# Patient Record
Sex: MALE | Race: WHITE | NOT HISPANIC OR LATINO | Employment: FULL TIME | URBAN - METROPOLITAN AREA
[De-identification: names, ages, dates, MRNs, and addresses within clinical notes are randomized per-mention and may not be internally consistent; named-entity substitution may affect disease eponyms.]

---

## 2019-04-03 ENCOUNTER — HOSPITAL ENCOUNTER (EMERGENCY)
Facility: HOSPITAL | Age: 24
Discharge: HOME | End: 2019-04-03
Attending: EMERGENCY MEDICINE | Admitting: EMERGENCY MEDICINE

## 2019-04-03 ENCOUNTER — APPOINTMENT (OUTPATIENT)
Dept: CT IMAGING | Facility: HOSPITAL | Age: 24
End: 2019-04-03

## 2019-04-03 VITALS
TEMPERATURE: 98.1 F | BODY MASS INDEX: 23.74 KG/M2 | OXYGEN SATURATION: 99 % | HEIGHT: 74 IN | DIASTOLIC BLOOD PRESSURE: 70 MMHG | SYSTOLIC BLOOD PRESSURE: 118 MMHG | HEART RATE: 70 BPM | WEIGHT: 185 LBS | RESPIRATION RATE: 18 BRPM

## 2019-04-03 DIAGNOSIS — G43.109 OCULAR MIGRAINE: Primary | ICD-10-CM

## 2019-04-03 PROCEDURE — 25010000002 METOCLOPRAMIDE PER 10 MG: Performed by: EMERGENCY MEDICINE

## 2019-04-03 PROCEDURE — 96374 THER/PROPH/DIAG INJ IV PUSH: CPT

## 2019-04-03 PROCEDURE — 70450 CT HEAD/BRAIN W/O DYE: CPT

## 2019-04-03 PROCEDURE — 25010000002 DEXAMETHASONE PER 1 MG: Performed by: EMERGENCY MEDICINE

## 2019-04-03 PROCEDURE — 99284 EMERGENCY DEPT VISIT MOD MDM: CPT

## 2019-04-03 PROCEDURE — 96375 TX/PRO/DX INJ NEW DRUG ADDON: CPT

## 2019-04-03 PROCEDURE — 25010000002 KETOROLAC TROMETHAMINE PER 15 MG: Performed by: EMERGENCY MEDICINE

## 2019-04-03 RX ORDER — METOCLOPRAMIDE 10 MG/1
TABLET ORAL
Qty: 20 TABLET | Refills: 0 | Status: SHIPPED | OUTPATIENT
Start: 2019-04-03

## 2019-04-03 RX ORDER — METOCLOPRAMIDE HYDROCHLORIDE 5 MG/ML
10 INJECTION INTRAMUSCULAR; INTRAVENOUS ONCE
Status: COMPLETED | OUTPATIENT
Start: 2019-04-03 | End: 2019-04-03

## 2019-04-03 RX ORDER — TOPIRAMATE 25 MG/1
25 TABLET ORAL 2 TIMES DAILY
Qty: 60 TABLET | Refills: 0 | Status: SHIPPED | OUTPATIENT
Start: 2019-04-03 | End: 2019-05-03

## 2019-04-03 RX ORDER — KETOROLAC TROMETHAMINE 15 MG/ML
10 INJECTION, SOLUTION INTRAMUSCULAR; INTRAVENOUS ONCE
Status: COMPLETED | OUTPATIENT
Start: 2019-04-03 | End: 2019-04-03

## 2019-04-03 RX ORDER — DEXAMETHASONE IN 0.9 % SOD CHL 10 MG/50ML
10 INTRAVENOUS SOLUTION, PIGGYBACK (ML) INTRAVENOUS ONCE
Status: COMPLETED | OUTPATIENT
Start: 2019-04-03 | End: 2019-04-03

## 2019-04-03 RX ORDER — SODIUM CHLORIDE 0.9 % (FLUSH) 0.9 %
10 SYRINGE (ML) INJECTION AS NEEDED
Status: DISCONTINUED | OUTPATIENT
Start: 2019-04-03 | End: 2019-04-04 | Stop reason: HOSPADM

## 2019-04-03 RX ADMIN — METOCLOPRAMIDE 10 MG: 5 INJECTION, SOLUTION INTRAMUSCULAR; INTRAVENOUS at 21:21

## 2019-04-03 RX ADMIN — KETOROLAC TROMETHAMINE 10 MG: 15 INJECTION, SOLUTION INTRAMUSCULAR; INTRAVENOUS at 21:21

## 2019-04-03 RX ADMIN — DEXAMETHASONE SODIUM PHOSPHATE 10 MG: 4 INJECTION, SOLUTION INTRAMUSCULAR; INTRAVENOUS at 21:24
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
## 2019-05-03 ENCOUNTER — OFFICE VISIT (OUTPATIENT)
Dept: NEUROLOGY | Facility: CLINIC | Age: 24
End: 2019-05-03

## 2019-05-03 VITALS
BODY MASS INDEX: 23.74 KG/M2 | WEIGHT: 185 LBS | HEIGHT: 74 IN | SYSTOLIC BLOOD PRESSURE: 112 MMHG | DIASTOLIC BLOOD PRESSURE: 72 MMHG

## 2019-05-03 DIAGNOSIS — G43.109 OCULAR MIGRAINE: Primary | ICD-10-CM

## 2019-05-03 PROCEDURE — 99204 OFFICE O/P NEW MOD 45 MIN: CPT | Performed by: PHYSICIAN ASSISTANT

## 2019-05-03 RX ORDER — TOPIRAMATE 25 MG/1
25 TABLET ORAL NIGHTLY
Qty: 120 TABLET | Refills: 11 | Status: SHIPPED | OUTPATIENT
Start: 2019-05-03 | End: 2019-06-06

## 2019-05-03 NOTE — PROGRESS NOTES
"Subjective     Chief Complaint: visual changes      History of Present Illness   Joseph Emerson is a 24 y.o. male who comes to clinic today for evaluation of episodes of visual disturbances. He initially noted symptoms in early 2019 marked by episdoes during which he has decreased left peripheral vision and flashes of light in his right peripheral vision. Occasionally, he has an associated right sided headache. This has worsened over time and is now nearly daily. The episodes last up to 45 minutes. There is associated numbness in his fingertips bilaterally and decreased balance. His symptoms are worse with increased stress.     Additionally, his mom reports a history of seizures in childhood. He has been seizure free since the age of 3. His mother noted episodes of unresponsiveness for several minutes. His left side would become rigid and begin shaking. He has previously taken Lamictal and tegretol.       I have reviewed and confirmed the past family, social and medical history as accurate on 5/3/19.     Review of Systems   Constitutional: Negative.    Eyes: Negative.    Respiratory: Negative.    Cardiovascular: Negative.    Gastrointestinal: Negative.    Endocrine: Negative.    Genitourinary: Negative.    Musculoskeletal: Negative.    Skin: Negative.    Allergic/Immunologic: Negative.    Neurological: Positive for headaches.   Hematological: Negative.    Psychiatric/Behavioral: Negative.        Objective     /72   Ht 188 cm (74\")   Wt 83.9 kg (185 lb)   BMI 23.75 kg/m²     General appearance today is normal.   Peripheral pulses were present and symmetric.  The ophthalmoscopic exam today is unremarkable. The discs and posterior elements are unremarkable.      Physical Exam   Neurological: He has normal strength. He has a normal Finger-Nose-Finger Test. Gait normal.   Reflex Scores:       Tricep reflexes are 2+ on the right side and 2+ on the left side.       Bicep reflexes are 2+ on the right side and 2+ on " the left side.       Brachioradialis reflexes are 2+ on the right side and 2+ on the left side.       Patellar reflexes are 2+ on the right side and 2+ on the left side.  Psychiatric: His speech is normal.        Neurologic Exam     Mental Status   Speech: speech is normal   Level of consciousness: alert  Normal comprehension.     Cranial Nerves   Cranial nerves II through XII intact.     Motor Exam   Muscle bulk: normal  Overall muscle tone: normal    Strength   Strength 5/5 throughout.     Sensory Exam   Light touch normal.     Gait, Coordination, and Reflexes     Gait  Gait: normal    Coordination   Finger to nose coordination: normal    Tremor   Resting tremor: absent    Reflexes   Right brachioradialis: 2+  Left brachioradialis: 2+  Right biceps: 2+  Left biceps: 2+  Right triceps: 2+  Left triceps: 2+  Right patellar: 2+  Left patellar: 2+            Assessment/Plan   Joseph was seen today for ocular migraine.    Diagnoses and all orders for this visit:    Ocular migraine    Other orders  -     topiramate (TOPAMAX) 25 MG tablet; Take 1 tablet by mouth Every Night.          Discussion/Summary   Joseph Emerson comes to clinic today with a history most consistent with ocular migraines. This was discussed.  It was elected to obtain an MRI of the brain given his previous history of seizures. After discussing potential treatment options, it was elected to increase his TPM to 100mg nightly. He will then follow up in 1 month, or sooner if needed.   I spent 45 minutes face to face with the patient and family with 25 minutes spent on discussing diagnosis, prognosis, diagnostic testing, evaluation, current status, treatment options and management as discussed above.       As part of this visit I reviewed prior lab results, reviewed radiology results, reviewed radiology images and obtained additional history from the family which is incorporated in the HPI.      Lashae Haskins PA-C

## 2019-05-06 ENCOUNTER — TELEPHONE (OUTPATIENT)
Dept: NEUROLOGY | Facility: CLINIC | Age: 24
End: 2019-05-06

## 2019-05-06 NOTE — TELEPHONE ENCOUNTER
Patient called and LVM stating he wanted to bring up some things Lashae Haskins told him to call if they happen. States he is having serious dizziness, more than normal, loss of hearing in left ear and loss of some vision in left eye.

## 2019-05-07 NOTE — TELEPHONE ENCOUNTER
It could potentially be the increase in topiramate. If its manageable, I would stay on the medication unchanged for now as it will hopefully get better over the next few days. If its unmanageable, I would try another medication. Just let me know. Thanks!

## 2019-05-07 NOTE — TELEPHONE ENCOUNTER
Called Joseph back and he states not the dizziness. He has been continuously dizzy for 3 days now, wakes up dizzy, goes to sleep dizzy, works at the [employer] all day feeling like he could just fall over if he didn't stop himself.
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
## 2019-05-08 NOTE — TELEPHONE ENCOUNTER
Joseph states that all sounds good and will follow Lashae Haskins's advice/would like to move forward with the [facility] referral as well.

## 2019-05-09 DIAGNOSIS — G43.109 OCULAR MIGRAINE: Primary | ICD-10-CM

## 2019-05-13 ENCOUNTER — TELEPHONE (OUTPATIENT)
Dept: NEUROLOGY | Facility: CLINIC | Age: 24
End: 2019-05-13

## 2019-05-13 NOTE — TELEPHONE ENCOUNTER
----- Message from Codie Arredondo sent at 5/13/2019  2:50 PM EDT -----  Contact: [phone]  Surjit Haskins left a vm with questions about his referrals.     [phone]

## 2019-05-17 NOTE — TELEPHONE ENCOUNTER
Joseph called to check on the status of his appt and notified him that I got him scheduled on the date mentioned above.     Gave him directions/Adress for this appt:  [address], [address], [address], [address], [place] [address]

## 2019-06-06 ENCOUNTER — OFFICE VISIT (OUTPATIENT)
Dept: NEUROLOGY | Facility: CLINIC | Age: 24
End: 2019-06-06

## 2019-06-06 VITALS
WEIGHT: 185 LBS | BODY MASS INDEX: 23.74 KG/M2 | DIASTOLIC BLOOD PRESSURE: 78 MMHG | SYSTOLIC BLOOD PRESSURE: 118 MMHG | HEIGHT: 74 IN

## 2019-06-06 DIAGNOSIS — G43.109 OCULAR MIGRAINE: Primary | ICD-10-CM

## 2019-06-06 DIAGNOSIS — R42 DIZZINESS: ICD-10-CM

## 2019-06-06 DIAGNOSIS — Z87.898 HISTORY OF SEIZURES: ICD-10-CM

## 2019-06-06 PROCEDURE — 99214 OFFICE O/P EST MOD 30 MIN: CPT | Performed by: PHYSICIAN ASSISTANT

## 2019-06-06 RX ORDER — TOPIRAMATE 100 MG/1
100 TABLET, FILM COATED ORAL NIGHTLY
Qty: 30 TABLET | Refills: 11 | Status: SHIPPED | OUTPATIENT
Start: 2019-06-06

## 2019-06-06 RX ORDER — TOPIRAMATE 25 MG/1
25 TABLET ORAL NIGHTLY
Qty: 30 TABLET | Refills: 11 | Status: SHIPPED | OUTPATIENT
Start: 2019-06-06

## 2019-06-06 NOTE — PROGRESS NOTES
Subjective     Chief Complaint: visual changes      History of Present Illness   Joseph Emerson is a 24 y.o. male who returns to clinic today for evaluation of episodes of visual disturbances. He initially noted symptoms in early 2019 marked by episdoes during which he has decreased left peripheral vision and flashes of light in his right peripheral vision. Occasionally, he has an associated right sided headache. This has worsened over time and is now nearly daily. The episodes last up to 45 minutes. There is associated numbness in his fingertips bilaterally and decreased balance. His symptoms are worse with increased stress.      Additionally, his mom reports a history of seizures in childhood. He has been seizure free since the age of 3. His mother noted episodes of unresponsiveness for several minutes. His left side would become rigid and begin shaking. He has previously taken Lamictal and tegretol.     Prior evaluation has included a head CT  which was unremarkable .      Today: Since his last visit in 5/19, his symptoms are unchanged. He continues to note flashes of light light in his vision daily. He notes worsened intermittent dizziness, described as lightheadedness. Additionally, he reports hearing loss bilaterally, though this is worse on the left. He is currently taking TPM 100mg nightly and is tolerating this well.       I have reviewed and confirmed the past family, social and medical history as accurate on 6/6/19.     Review of Systems   Constitutional: Negative.    HENT: Negative.    Eyes: Negative.    Respiratory: Negative.    Cardiovascular: Negative.    Gastrointestinal: Negative.    Endocrine: Negative.    Genitourinary: Negative.    Musculoskeletal: Negative.    Skin: Negative.    Allergic/Immunologic: Negative.    Hematological: Negative.    Psychiatric/Behavioral: Negative.        Objective     There were no vitals taken for this visit.    General appearance today is normal.       Physical Exam    Neurological: He has normal strength. He has a normal Finger-Nose-Finger Test and a normal Romberg Test. Gait normal.   Psychiatric: His speech is normal.        Neurologic Exam     Mental Status   Speech: speech is normal   Level of consciousness: alert  Normal comprehension.     Cranial Nerves   Cranial nerves II through XII intact.     Motor Exam   Muscle bulk: normal  Overall muscle tone: normal    Strength   Strength 5/5 throughout.     Sensory Exam   Light touch normal.     Gait, Coordination, and Reflexes     Gait  Gait: normal    Coordination   Romberg: negative  Finger to nose coordination: normal    Tremor   Resting tremor: absent      Assessment/Plan   Joseph was seen today for follow-up.    Diagnoses and all orders for this visit:    Ocular migraine  -     MRI Brain Without Contrast    History of seizures  -     MRI Brain Without Contrast    Dizziness  -     Ambulatory Referral to ENT (Otolaryngology)  -     MRI Brain Without Contrast    Other orders  -     topiramate (TOPAMAX) 100 MG tablet; Take 1 tablet by mouth Every Night.  -     topiramate (TOPAMAX) 25 MG tablet; Take 1 tablet by mouth Every Night.          Discussion/Summary   Joseph Emerson comes to clinic today with multiple issues. While the etiology is somewhat unclear, I am concerned that his headaches and visual changes are potentially related to ocular migraines. It was elected to obtain an MRI of the brain as previously planned given his symptoms and history of seizures. After discussing potential treatment options, it was elected to increase his TPM to 150mg nightly. I have also made a referral to ENT given his associated dizziness and hearing loss. He will then follow up in 1 month, or sooner if needed.   I spent 25 minutes face to face with the patient with 15 minutes spent on discussing diagnosis, prognosis, diagnostic testing, evaluation, current status, treatment options and management as discussed above.       As part of this  visit I discussed the history with the patient .      Lashae Haskins PA-C

## 2019-06-14 ENCOUNTER — HOSPITAL ENCOUNTER (OUTPATIENT)
Dept: MRI IMAGING | Facility: HOSPITAL | Age: 24
Discharge: HOME | End: 2019-06-14
Admitting: PHYSICIAN ASSISTANT

## 2019-06-14 PROCEDURE — 70551 MRI BRAIN STEM W/O DYE: CPT

## 2019-06-17 ENCOUNTER — TELEPHONE (OUTPATIENT)
Dept: NEUROLOGY | Facility: CLINIC | Age: 24
End: 2019-06-17

## 2019-06-17 NOTE — TELEPHONE ENCOUNTER
Noé Bhardwaj called asking if you could review his MRI results from Friday once you have a chance?      Also inquired about whether his ENT referral could be sent to  which I have faxed the referral over there requesting to please schedule.  Fax:[phone]

## 2019-06-18 DIAGNOSIS — R93.0 ABNORMAL MRI OF HEAD: Primary | ICD-10-CM

## 2019-06-18 NOTE — TELEPHONE ENCOUNTER
Sorry about that Lashae! You definitely did already send them to me yesterday and I just noticed this morning.    Relayed results to Noé who stated understanding and would definitely like to move forward with the MRI with Contrast. Took the news well, but definitely wants to know for sure if it is MS. Bhardwaj,  Could you please put in his order for MRI w/ Contrast and I will call [facility] to see if we can get him in fairly soon for an 8am appt so he does not have to miss work. Thanks!

## 2019-06-18 NOTE — TELEPHONE ENCOUNTER
Hey, I think I sent you the MRI results under the results tab. I would like to get an MRI with contrast as it did show some findings somewhat concerning for MS. We will be able to get a more detailed look with contrast. Thank you!

## 2019-06-18 NOTE — TELEPHONE ENCOUNTER
Lashae Haskins PA-C Dieruf, Stephanie S, ALICIA Oakes,     Would you care to let Mr. Emerson know that we reviewed his MRI results. It was notable for some abnormal signal and is somewhat concerning with MS. Therefore, I would like to repeat an MRI with contrast to take a better look. We can then go over those results together in the office if he would like. I will put the order when he agrees. Thanks so much.      Previous Messages      ----- Message -----   From: Stanley Diaz MD   Sent: 6/17/2019  12:18 PM   To: Lashae Haskins PA-C     I'm not able to access these images either, but the report is concerning for MS. I'd recommend a repeat MRI with contrast, looking for any evidence of active disease. We could review both sets of images together then. Thanks.     ----- Message -----   From: Lashae Haskins PA-C   Sent: 6/17/2019   8:13 AM   To: Stanley Diaz MD     Can we review this together when you get a chance? I am unable to pull it up, so I'm not sure if you'll be able to either. Thanks!!       ----- Message -----   From: Interface, Rad Results [place] In   Sent: 6/14/2019   5:48 PM   To: Lashae Haskins PA-C

## 2019-06-19 ENCOUNTER — TELEPHONE (OUTPATIENT)
Dept: NEUROLOGY | Facility: CLINIC | Age: 24
End: 2019-06-19

## 2019-06-19 NOTE — TELEPHONE ENCOUNTER
Called Noé and updated him about his MRI with contrast letting him know CS is still working on getting that approved, but then should be expecting a call to schedule. Stated verbal understanding and asked if he could have a copy of this past MRI report in which I gladly sent to him.

## 2019-06-21 ENCOUNTER — TELEPHONE (OUTPATIENT)
Dept: NEUROLOGY | Facility: CLINIC | Age: 24
End: 2019-06-21

## 2019-06-21 NOTE — TELEPHONE ENCOUNTER
Noé called again to check on the status of his MRI Brain w/ Contrast. Called [facility] as I noticed as of this morning [facility] states still pending,but the provider may do a peer to peer to speed things up.    Called [facility] provider # and [facility] to [facility] ([phone]). Bashir with [facility] notified this s been approved and obtained the Authorization #:  [id number]  Documented the Auth in the referral and have called [facility]. Patient has been scheduled for tomorrow morning at 8:30am.    Notified Noé of appt time. Needs to show up at the  [facility] @ 8:30am tomorrow to register and let them know he has a scheduled appt. Notified may not drink or eat anything 2 hours prior. Patient stated verbal understanding.

## 2019-06-22 ENCOUNTER — HOSPITAL ENCOUNTER (OUTPATIENT)
Dept: MRI IMAGING | Facility: HOSPITAL | Age: 24
Discharge: HOME | End: 2019-06-22
Admitting: PHYSICIAN ASSISTANT

## 2019-06-22 DIAGNOSIS — R93.0 ABNORMAL MRI OF HEAD: ICD-10-CM

## 2019-06-22 PROCEDURE — 0 GADOBENATE DIMEGLUMINE 529 MG/ML SOLUTION: Performed by: PHYSICIAN ASSISTANT

## 2019-06-22 PROCEDURE — 70552 MRI BRAIN STEM W/DYE: CPT

## 2019-06-22 PROCEDURE — A9577 INJ MULTIHANCE: HCPCS | Performed by: PHYSICIAN ASSISTANT

## 2019-06-22 RX ADMIN — GADOBENATE DIMEGLUMINE 15 ML: 529 INJECTION, SOLUTION INTRAVENOUS at 09:10

## 2019-06-25 ENCOUNTER — TELEPHONE (OUTPATIENT)
Dept: NEUROLOGY | Facility: CLINIC | Age: 24
End: 2019-06-25

## 2019-06-25 NOTE — TELEPHONE ENCOUNTER
His appt at  on 7/27/19 @ 7:30am is with . Notified patient.     Side note: Looks like he was previously already referred to [facility] for further Tx as this is a neurologist?

## 2019-06-25 NOTE — TELEPHONE ENCOUNTER
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     Also inquired about the name of the specialist he is seeing at  at the end of next month so notified I will call over there and find out for him on both accords!

## 2019-06-25 NOTE — TELEPHONE ENCOUNTER
Tawana,  At the patients request/Lashae's recommendation, do you think  would be willing to review Noé's recent MRI w/ and w/out contrast scans and help determine whether he has MS or not?

## 2019-06-25 NOTE — TELEPHONE ENCOUNTER
Relayed appt date and time to Noé who states great appreciation for 's office fitting him so soon and cancelled follow up appt with Lashae Haskins.    Patient will arrive by or before 10:00am for his appointment as this is much closer to his house as well!

## 2019-06-25 NOTE — TELEPHONE ENCOUNTER
---- Message from Lashae Haskins PA-C sent at 6/24/2019  3:23 PM EDT -----  Would you care to let Mr. Emerson know that we reviewed his MRI? While, the lack of contrast enhancement is reassuring. However, MS remains a concern. I would offer to repeat scan in 6 months, or order visual evoked potentials and/or CSF examination (with MS screen), or try to refer to Dr. Garcia for his opinion regarding MS. Just let me know what he would like to do. Thanks!       ----- Message -----  From: Stanley Diaz MD  Sent: 6/24/2019   8:49 AM  To: Lashae Haskins PA-C    The lack of contrast enhancement is reassuring. However, MS remains a concern. I would offer to repeat scan in 6 months, or order visual evoked potentials and/or CSF examination (with MS screen), or try to refer to Dr. Garcia for his opinion regarding MS. Thanks.  ----- Message -----  From: Lashae Haskins PA-C  Sent: 6/24/2019   8:11 AM  To: Stanley Diaz MD    Would you care to review? Thanks    ----- Message -----  From: Brielle, Rad Results [place] In  Sent: 6/23/2019  11:38 AM  To: Lashae Haskins PA-C

## 2019-06-25 NOTE — TELEPHONE ENCOUNTER
Scheduled an appointment for the patient, called the ALICIA Irving, advised her of the appointment details. She is to call the patient and advise him of the appointment details.

## 2019-06-25 NOTE — TELEPHONE ENCOUNTER
Dr Garcia,     Per request of Lashae Haskins and the patient could you please review this patient scans and see if it is concerning for MS. Thanks!!

## 2019-07-05 ENCOUNTER — LAB (OUTPATIENT)
Dept: LAB | Facility: HOSPITAL | Age: 24
End: 2019-07-05

## 2019-07-05 ENCOUNTER — OFFICE VISIT (OUTPATIENT)
Dept: NEUROLOGY | Facility: CLINIC | Age: 24
End: 2019-07-05

## 2019-07-05 VITALS
SYSTOLIC BLOOD PRESSURE: 114 MMHG | RESPIRATION RATE: 16 BRPM | HEIGHT: 74 IN | BODY MASS INDEX: 22.97 KG/M2 | OXYGEN SATURATION: 100 % | DIASTOLIC BLOOD PRESSURE: 76 MMHG | WEIGHT: 179 LBS | HEART RATE: 65 BPM

## 2019-07-05 DIAGNOSIS — G37.9 DEMYELINATING DISEASE OF CENTRAL NERVOUS SYSTEM (HCC): Primary | ICD-10-CM

## 2019-07-05 DIAGNOSIS — G37.9 DEMYELINATING DISEASE OF CENTRAL NERVOUS SYSTEM (HCC): ICD-10-CM

## 2019-07-05 LAB
BASOPHILS # BLD AUTO: 0.06 10*3/MM3 (ref 0–0.2)
BASOPHILS NFR BLD AUTO: 1.1 % (ref 0–1.5)
CHROMATIN AB SERPL-ACNC: <10 IU/ML (ref 0–14)
DEPRECATED RDW RBC AUTO: 40.6 FL (ref 37–54)
EOSINOPHIL # BLD AUTO: 0.12 10*3/MM3 (ref 0–0.4)
EOSINOPHIL NFR BLD AUTO: 2.2 % (ref 0.3–6.2)
ERYTHROCYTE [DISTWIDTH] IN BLOOD BY AUTOMATED COUNT: 11.9 % (ref 12.3–15.4)
ERYTHROCYTE [SEDIMENTATION RATE] IN BLOOD: 1 MM/HR (ref 0–15)
FOLATE SERPL-MCNC: 9.95 NG/ML (ref 4.78–24.2)
HCT VFR BLD AUTO: 45.4 % (ref 37.5–51)
HGB BLD-MCNC: 15 G/DL (ref 13–17.7)
IMM GRANULOCYTES # BLD AUTO: 0.01 10*3/MM3 (ref 0–0.05)
IMM GRANULOCYTES NFR BLD AUTO: 0.2 % (ref 0–0.5)
LYMPHOCYTES # BLD AUTO: 2.83 10*3/MM3 (ref 0.7–3.1)
LYMPHOCYTES NFR BLD AUTO: 51.3 % (ref 19.6–45.3)
MCH RBC QN AUTO: 30.5 PG (ref 26.6–33)
MCHC RBC AUTO-ENTMCNC: 33 G/DL (ref 31.5–35.7)
MCV RBC AUTO: 92.5 FL (ref 79–97)
MONOCYTES # BLD AUTO: 0.55 10*3/MM3 (ref 0.1–0.9)
MONOCYTES NFR BLD AUTO: 10 % (ref 5–12)
NEUTROPHILS # BLD AUTO: 1.95 10*3/MM3 (ref 1.7–7)
NEUTROPHILS NFR BLD AUTO: 35.2 % (ref 42.7–76)
NRBC BLD AUTO-RTO: 0 /100 WBC (ref 0–0.2)
PLATELET # BLD AUTO: 167 10*3/MM3 (ref 140–450)
PMV BLD AUTO: 12.2 FL (ref 6–12)
RBC # BLD AUTO: 4.91 10*6/MM3 (ref 4.14–5.8)
VIT B12 BLD-MCNC: 401 PG/ML (ref 211–946)
WBC NRBC COR # BLD: 5.52 10*3/MM3 (ref 3.4–10.8)

## 2019-07-05 PROCEDURE — 85613 RUSSELL VIPER VENOM DILUTED: CPT

## 2019-07-05 PROCEDURE — 85025 COMPLETE CBC W/AUTO DIFF WBC: CPT

## 2019-07-05 PROCEDURE — 99215 OFFICE O/P EST HI 40 MIN: CPT | Performed by: PSYCHIATRY & NEUROLOGY

## 2019-07-05 PROCEDURE — 86148 ANTI-PHOSPHOLIPID ANTIBODY: CPT

## 2019-07-05 PROCEDURE — 85597 PHOSPHOLIPID PLTLT NEUTRALIZ: CPT

## 2019-07-05 PROCEDURE — 85598 HEXAGNAL PHOSPH PLTLT NEUTRL: CPT

## 2019-07-05 PROCEDURE — 82746 ASSAY OF FOLIC ACID SERUM: CPT

## 2019-07-05 PROCEDURE — 85730 THROMBOPLASTIN TIME PARTIAL: CPT

## 2019-07-05 PROCEDURE — 86146 BETA-2 GLYCOPROTEIN ANTIBODY: CPT

## 2019-07-05 PROCEDURE — 85652 RBC SED RATE AUTOMATED: CPT

## 2019-07-05 PROCEDURE — 82164 ANGIOTENSIN I ENZYME TEST: CPT

## 2019-07-05 PROCEDURE — 85732 THROMBOPLASTIN TIME PARTIAL: CPT

## 2019-07-05 PROCEDURE — 86255 FLUORESCENT ANTIBODY SCREEN: CPT

## 2019-07-05 PROCEDURE — 86147 CARDIOLIPIN ANTIBODY EA IG: CPT

## 2019-07-05 PROCEDURE — 36415 COLL VENOUS BLD VENIPUNCTURE: CPT

## 2019-07-05 PROCEDURE — 82607 VITAMIN B-12: CPT

## 2019-07-05 PROCEDURE — 86431 RHEUMATOID FACTOR QUANT: CPT

## 2019-07-05 PROCEDURE — 85610 PROTHROMBIN TIME: CPT

## 2019-07-05 PROCEDURE — 85670 THROMBIN TIME PLASMA: CPT

## 2019-07-05 PROCEDURE — 86038 ANTINUCLEAR ANTIBODIES: CPT

## 2019-07-05 RX ORDER — PREDNISONE 20 MG/1
TABLET ORAL
Qty: 12 TABLET | Refills: 0 | Status: SHIPPED | OUTPATIENT
Start: 2019-07-05 | End: 2019-08-02

## 2019-07-05 NOTE — PROGRESS NOTES
Subjective   Patient ID: Joseph Emerson is a 24 y.o. male     Chief Complaint   Patient presents with   • Abnormal MRI        History of Present Illness    24 y.o. male referred by Lashae Haskins for possible MS. Three months started having unilateral hemifield vision loss, visual obscurations last from 30 minutes to a day.  Mild pressure in forehead assoc with sx.     Easter developed decreased hearing L ear. Decreased by 30%.  Sensation of ear is muffled.  Trouble with loud noises.      Right ear developing muffled sound.      Dizziness in mornings.       Eyes examined by  Dr Yo and unremarkable.      MRI Brain, my review of films, 6/22/19 multiple CC lesions and PVWM, BG    Reviewed medical records:    Pt reported visual disturbances in early 2019.  Decreased left peripheral vision and flashes of light in right peripheral vision,  dizziness.  Sz in childhood resolved by 3 yoa.    Reports hearing loss bilaterally, worse on the left      Past Medical History:   Diagnosis Date   • Migraine    • Seizures (CMS/HCC)     as a kid     History reviewed. No pertinent family history.  Social History     Socioeconomic History   • Marital status: Unknown     Spouse name: Not on file   • Number of children: Not on file   • Years of education: Not on file   • Highest education level: Not on file   Tobacco Use   • Smoking status: Never Smoker   • Smokeless tobacco: Never Used   Substance and Sexual Activity   • Alcohol use: No     Frequency: Never   • Drug use: Defer   • Sexual activity: Defer       Review of Systems   Constitutional: Negative for activity change, fatigue and unexpected weight change.   HENT: Positive for hearing loss. Negative for facial swelling, tinnitus, trouble swallowing and voice change.    Eyes: Positive for visual disturbance. Negative for photophobia and pain.   Respiratory: Negative for apnea, cough and choking.    Cardiovascular: Negative for chest pain.   Gastrointestinal: Negative for  "constipation, nausea and vomiting.   Endocrine: Negative for cold intolerance.   Genitourinary: Negative for difficulty urinating, frequency and urgency.   Musculoskeletal: Negative for arthralgias, back pain, gait problem, myalgias, neck pain and neck stiffness.   Skin: Negative for rash.   Allergic/Immunologic: Negative for immunocompromised state.   Neurological: Positive for dizziness, numbness and headaches. Negative for tremors, seizures, syncope, facial asymmetry, speech difficulty, weakness and light-headedness.   Hematological: Negative for adenopathy.   Psychiatric/Behavioral: Negative for confusion, decreased concentration, hallucinations and sleep disturbance. The patient is not nervous/anxious.        Objective     Vitals:    07/05/19 1003   BP: 114/76   BP Location: Left arm   Patient Position: Sitting   Cuff Size: Adult   Pulse: 65   Resp: 16   SpO2: 100%   Weight: 81.2 kg (179 lb)   Height: 188 cm (74\")       Neurologic Exam     Mental Status   Oriented to person, place, and time.   Registration: recalls 3 of 3 objects. Recall at 5 minutes: recalls 3 of 3 objects. Follows 3 step commands.   Attention: normal. Concentration: normal.   Speech: speech is normal   Level of consciousness: alert  Knowledge: good and consistent with education.   Able to name object. Able to read. Able to repeat. Able to write. Normal comprehension.     Cranial Nerves     CN II   Visual fields full to confrontation.   Visual acuity: normal  Right visual field deficit: none  Left visual field deficit: none     CN III, IV, VI   Pupils are equal, round, and reactive to light.  Extraocular motions are normal.   Right pupil: Shape: regular. Reactivity: brisk. Consensual response: intact.   Left pupil: Shape: regular. Reactivity: brisk. Consensual response: intact.   Nystagmus: none   Diplopia: none  Ophthalmoparesis: none  Upgaze: normal  Downgaze: normal  Conjugate gaze: present  Vestibulo-ocular reflex: present    CN V   Facial " sensation intact.   Right corneal reflex: normal  Left corneal reflex: normal    CN VII   Right facial weakness: none  Left facial weakness: none    CN VIII   Hearing: impaired (L > R )    CN IX, X   Palate: symmetric  Right gag reflex: normal  Left gag reflex: normal    CN XI   Right sternocleidomastoid strength: normal  Left sternocleidomastoid strength: normal    CN XII   Tongue: not atrophic  Fasciculations: absent  Tongue deviation: none    Motor Exam   Muscle bulk: normal  Overall muscle tone: normal  Right arm tone: normal  Left arm tone: normal  Right leg tone: normal  Left leg tone: normal    Strength   Strength 5/5 throughout.     Sensory Exam   Light touch normal.   Vibration normal.   Proprioception normal.   Pinprick normal.     Gait, Coordination, and Reflexes     Gait  Gait: normal    Coordination   Romberg: negative  Finger to nose coordination: normal  Heel to shin coordination: normal  Tandem walking coordination: normal    Tremor   Resting tremor: absent  Intention tremor: absent  Action tremor: absent    Reflexes   Reflexes 2+ except as noted.       Physical Exam   Constitutional: He is oriented to person, place, and time. Vital signs are normal. He appears well-developed and well-nourished.   HENT:   Head: Normocephalic and atraumatic.   Eyes: EOM and lids are normal. Pupils are equal, round, and reactive to light.   Fundoscopic exam:       The right eye shows no exudate, no hemorrhage and no papilledema. The right eye shows venous pulsations.        The left eye shows no exudate, no hemorrhage and no papilledema. The left eye shows venous pulsations.   Neck: Normal range of motion and phonation normal. Normal carotid pulses present. Carotid bruit is not present. No thyroid mass and no thyromegaly present.   Cardiovascular: Normal rate, regular rhythm and normal heart sounds.   Pulmonary/Chest: Effort normal.   Neurological: He is oriented to person, place, and time. He has normal strength. He  has a normal Finger-Nose-Finger Test, a normal Heel to Shin Test, a normal Romberg Test and a normal Tandem Gait Test. Gait normal.   Skin: Skin is warm and dry.   Psychiatric: He has a normal mood and affect. His speech is normal.   Nursing note and vitals reviewed.      No results found for any previous visit.         Assessment/Plan     Problem List Items Addressed This Visit        Nervous and Auditory    Demyelinating disease of central nervous system (CMS/HCC) - Primary    Current Assessment & Plan     Multiple CC lesions and hearing loss   Most likely is RRMS but Susac's is a concern    Labs for mimickers of MS and LP     Prednisone taper          Relevant Orders    KRUNAL by IFA, Reflex 9-biomarkers profile    Angiotensin Converting Enzyme    CBC & Differential    Cadasil Evaluation    Neuromyelitis Optica (NMO) Auto Antibody, IgG    Myelin [id number]    Phospholipid Antibodies (APhL Mixture)    Lupus Anticoagulant Panel    Sedimentation Rate    Rheumatoid Factor    Vitamin B12 & Folate    Cardiolipin Antibody    IR Lumbar Puncture Diagnosis             No Follow-up on file.

## 2019-07-05 NOTE — ASSESSMENT & PLAN NOTE
Multiple CC lesions and hearing loss   Most likely is RRMS but Susac's is a concern    Labs for mimickers of MS and LP     Prednisone taper

## 2019-07-06 LAB
ANA SER QL IA: NEGATIVE
CARDIOLIPIN IGA SER IA-ACNC: <9 APL U/ML (ref 0–11)
CARDIOLIPIN IGG SER IA-ACNC: <9 GPL U/ML (ref 0–14)
CARDIOLIPIN IGM SER IA-ACNC: <9 MPL U/ML (ref 0–12)
Lab: NORMAL

## 2019-07-08 LAB — ACE SERPL-CCNC: 22 U/L (ref 14–82)

## 2019-07-09 LAB
PS IGG SER-ACNC: 4 GPS IGG (ref 0–11)
PS IGM SER-ACNC: 6 MPS IGM (ref 0–25)

## 2019-07-12 LAB
APTT HEX PL PPP: 3 SEC
APTT IMM NP PPP: NORMAL SEC
APTT PPP 1:1 SALINE: NORMAL SEC
APTT PPP: 24.1 SEC
B2 GLYCOPROT1 IGA SER-ACNC: <10 SAU
B2 GLYCOPROT1 IGG SER-ACNC: <10 SGU
B2 GLYCOPROT1 IGM SER-ACNC: <10 SMU
CARDIOLIPIN IGG SER IA-ACNC: <10 GPL
CARDIOLIPIN IGM SER IA-ACNC: <10 MPL
CONFIRM DRVVT: NORMAL SEC
INR PPP: 1.1 RATIO
LAC INTERPRETATION: NORMAL
PLATELET NEUTRALIZATION: 0 SEC
PROTHROMBIN TIME: 11.2 SEC
REF LAB TEST RESULTS: NORMAL
REF LAB TEST RESULTS: NORMAL
SCREEN DRVVT/NORMAL: NORMAL RATIO
SCREEN DRVVT: 33.7 SEC
THROMBIN TIME: 18.3 SEC

## 2019-07-19 ENCOUNTER — HOSPITAL ENCOUNTER (OUTPATIENT)
Dept: GENERAL RADIOLOGY | Facility: HOSPITAL | Age: 24
Discharge: HOME | End: 2019-07-19
Admitting: PSYCHIATRY & NEUROLOGY

## 2019-07-19 VITALS
HEIGHT: 74 IN | DIASTOLIC BLOOD PRESSURE: 65 MMHG | OXYGEN SATURATION: 96 % | TEMPERATURE: 98.4 F | HEART RATE: 65 BPM | BODY MASS INDEX: 22.46 KG/M2 | WEIGHT: 175 LBS | SYSTOLIC BLOOD PRESSURE: 120 MMHG | RESPIRATION RATE: 18 BRPM

## 2019-07-19 DIAGNOSIS — G37.9 DEMYELINATING DISEASE OF CENTRAL NERVOUS SYSTEM (HCC): ICD-10-CM

## 2019-07-19 LAB
APPEARANCE CSF: CLEAR
APPEARANCE CSF: CLEAR
COLOR CSF: COLORLESS
COLOR CSF: COLORLESS
COLOR SPUN CSF: COLORLESS
COLOR SPUN CSF: COLORLESS
GLUCOSE CSF-MCNC: 57 MG/DL (ref 40–70)
PROT CSF-MCNC: 58.1 MG/DL (ref 15–45)
RBC # CSF MANUAL: 53 /MM3 (ref 0–5)
RBC # CSF MANUAL: 7 /MM3 (ref 0–5)
SPECIMEN VOL CSF: 9 ML
SPECIMEN VOL CSF: 9 ML
TUBE # CSF: 1
TUBE # CSF: 4
WBC # CSF MANUAL: 3 /MM3 (ref 0–5)
WBC # CSF MANUAL: 3 /MM3 (ref 0–5)

## 2019-07-19 PROCEDURE — 82945 GLUCOSE OTHER FLUID: CPT | Performed by: PSYCHIATRY & NEUROLOGY

## 2019-07-19 PROCEDURE — 87205 SMEAR GRAM STAIN: CPT | Performed by: PSYCHIATRY & NEUROLOGY

## 2019-07-19 PROCEDURE — 82164 ANGIOTENSIN I ENZYME TEST: CPT | Performed by: PSYCHIATRY & NEUROLOGY

## 2019-07-19 PROCEDURE — 83916 OLIGOCLONAL BANDS: CPT | Performed by: PSYCHIATRY & NEUROLOGY

## 2019-07-19 PROCEDURE — 89050 BODY FLUID CELL COUNT: CPT | Performed by: PSYCHIATRY & NEUROLOGY

## 2019-07-19 PROCEDURE — 82040 ASSAY OF SERUM ALBUMIN: CPT | Performed by: PSYCHIATRY & NEUROLOGY

## 2019-07-19 PROCEDURE — 87015 SPECIMEN INFECT AGNT CONCNTJ: CPT | Performed by: PSYCHIATRY & NEUROLOGY

## 2019-07-19 PROCEDURE — 82042 OTHER SOURCE ALBUMIN QUAN EA: CPT | Performed by: PSYCHIATRY & NEUROLOGY

## 2019-07-19 PROCEDURE — 84157 ASSAY OF PROTEIN OTHER: CPT | Performed by: PSYCHIATRY & NEUROLOGY

## 2019-07-19 PROCEDURE — 87070 CULTURE OTHR SPECIMN AEROBIC: CPT | Performed by: PSYCHIATRY & NEUROLOGY

## 2019-07-19 PROCEDURE — 87206 SMEAR FLUORESCENT/ACID STAI: CPT | Performed by: PSYCHIATRY & NEUROLOGY

## 2019-07-19 PROCEDURE — 87327 CRYPTOCOCCUS NEOFORM AG IA: CPT | Performed by: PSYCHIATRY & NEUROLOGY

## 2019-07-19 PROCEDURE — 77003 FLUOROGUIDE FOR SPINE INJECT: CPT

## 2019-07-19 PROCEDURE — 87116 MYCOBACTERIA CULTURE: CPT | Performed by: PSYCHIATRY & NEUROLOGY

## 2019-07-19 PROCEDURE — 88112 CYTOPATH CELL ENHANCE TECH: CPT | Performed by: PSYCHIATRY & NEUROLOGY

## 2019-07-19 PROCEDURE — 82784 ASSAY IGA/IGD/IGG/IGM EACH: CPT | Performed by: PSYCHIATRY & NEUROLOGY

## 2019-07-19 PROCEDURE — 86592 SYPHILIS TEST NON-TREP QUAL: CPT | Performed by: PSYCHIATRY & NEUROLOGY

## 2019-07-19 RX ORDER — LIDOCAINE HYDROCHLORIDE 10 MG/ML
10 INJECTION, SOLUTION EPIDURAL; INFILTRATION; INTRACAUDAL; PERINEURAL ONCE
Status: COMPLETED | OUTPATIENT
Start: 2019-07-19 | End: 2019-07-19

## 2019-07-19 RX ADMIN — LIDOCAINE HYDROCHLORIDE 10 ML: 10 INJECTION, SOLUTION EPIDURAL; INFILTRATION; INTRACAUDAL; PERINEURAL at 15:30

## 2019-07-19 NOTE — NURSING NOTE
Pt discharged to home s/p LP.  Pt tolerated procedure without complications.  Discharge instructions reviewed with patient and family, pt voices understanding.  Pt transported to exit via wheelchair per RN.

## 2019-07-20 LAB — CRYPTOC AG CSF QL LA: NEGATIVE

## 2019-07-22 ENCOUNTER — TELEPHONE (OUTPATIENT)
Dept: INFUSION THERAPY | Facility: HOSPITAL | Age: 24
End: 2019-07-22

## 2019-07-22 LAB
BACTERIA SPEC AEROBE CULT: NORMAL
GRAM STN SPEC: NORMAL
REAGIN AB CSF QL: NON REACTIVE

## 2019-07-23 LAB
ACE CSF-CCNC: 0.8 U/L (ref 0–2.8)
ALB CSF/SERPL: 7 {RATIO} (ref 0–8)
ALBUMIN CSF-MCNC: 32 MG/DL (ref 11–48)
ALBUMIN SERPL-MCNC: 4.6 G/DL (ref 3.5–5.5)
IGG CSF-MCNC: 5.2 MG/DL (ref 0–8.6)
IGG SERPL-MCNC: 1050 MG/DL (ref 700–1600)
IGG SYNTH RATE SER+CSF CALC-MRATE: 5.9 MG/DAY
IGG/ALB CLEAR SER+CSF-RTO: 0.7 (ref 0–0.7)
IGG/ALB CSF: 0.16 {RATIO} (ref 0–0.25)
LAB AP CASE REPORT: NORMAL
OLIGOCLONAL BANDS.IT SER+CSF QL: ABNORMAL
PATH REPORT.FINAL DX SPEC: NORMAL

## 2019-07-26 ENCOUNTER — TELEPHONE (OUTPATIENT)
Dept: NEUROLOGY | Facility: CLINIC | Age: 24
End: 2019-07-26

## 2019-07-29 ENCOUNTER — TELEPHONE (OUTPATIENT)
Dept: NEUROLOGY | Facility: CLINIC | Age: 24
End: 2019-07-29

## 2019-08-01 NOTE — TELEPHONE ENCOUNTER
Got the patient scheduled for a sooner appointment to come in and speak with the provider regarding the LP results.

## 2019-08-02 ENCOUNTER — LAB (OUTPATIENT)
Dept: LAB | Facility: HOSPITAL | Age: 24
End: 2019-08-02

## 2019-08-02 ENCOUNTER — OFFICE VISIT (OUTPATIENT)
Dept: NEUROLOGY | Facility: CLINIC | Age: 24
End: 2019-08-02

## 2019-08-02 VITALS
OXYGEN SATURATION: 99 % | HEART RATE: 53 BPM | RESPIRATION RATE: 16 BRPM | BODY MASS INDEX: 22.59 KG/M2 | DIASTOLIC BLOOD PRESSURE: 70 MMHG | SYSTOLIC BLOOD PRESSURE: 98 MMHG | HEIGHT: 74 IN | WEIGHT: 176 LBS

## 2019-08-02 DIAGNOSIS — G35 MULTIPLE SCLEROSIS (HCC): ICD-10-CM

## 2019-08-02 DIAGNOSIS — H90.3 SENSORINEURAL HEARING LOSS (SNHL) OF BOTH EARS: ICD-10-CM

## 2019-08-02 DIAGNOSIS — G43.109 OCULAR MIGRAINE: ICD-10-CM

## 2019-08-02 DIAGNOSIS — G35 MULTIPLE SCLEROSIS (HCC): Primary | ICD-10-CM

## 2019-08-02 PROBLEM — G37.9 DEMYELINATING DISEASE OF CENTRAL NERVOUS SYSTEM (HCC): Status: RESOLVED | Noted: 2019-07-05 | Resolved: 2019-08-02

## 2019-08-02 LAB
HAV IGM SERPL QL IA: NORMAL
HBV CORE IGM SERPL QL IA: NORMAL
HBV SURFACE AG SERPL QL IA: NORMAL
HCV AB SER DONR QL: NORMAL

## 2019-08-02 PROCEDURE — 80074 ACUTE HEPATITIS PANEL: CPT

## 2019-08-02 PROCEDURE — 36415 COLL VENOUS BLD VENIPUNCTURE: CPT

## 2019-08-02 PROCEDURE — 99214 OFFICE O/P EST MOD 30 MIN: CPT | Performed by: PSYCHIATRY & NEUROLOGY

## 2019-08-02 RX ORDER — SODIUM CHLORIDE 9 MG/ML
250 INJECTION, SOLUTION INTRAVENOUS ONCE
OUTPATIENT
Start: 2019-08-02

## 2019-08-02 RX ORDER — DIPHENHYDRAMINE HYDROCHLORIDE 50 MG/ML
25 INJECTION INTRAMUSCULAR; INTRAVENOUS ONCE
OUTPATIENT
Start: 2019-08-02

## 2019-08-02 RX ORDER — DIPHENHYDRAMINE HYDROCHLORIDE 50 MG/ML
50 INJECTION INTRAMUSCULAR; INTRAVENOUS AS NEEDED
OUTPATIENT
Start: 2019-08-02

## 2019-08-02 RX ORDER — FAMOTIDINE 10 MG/ML
20 INJECTION, SOLUTION INTRAVENOUS AS NEEDED
OUTPATIENT
Start: 2019-08-02

## 2019-08-02 RX ORDER — MEPERIDINE HYDROCHLORIDE 50 MG/ML
25 INJECTION INTRAMUSCULAR; INTRAVENOUS; SUBCUTANEOUS
OUTPATIENT
Start: 2019-08-02

## 2019-08-02 NOTE — PROGRESS NOTES
"Subjective   Patient ID: Joseph Emerson is a 24 y.o. male     Chief Complaint   Patient presents with   • Demyelinating Disease        History of Present Illness    24 y.o. male returns in follow up for possible MS and TPM. Last visit on 7/5/19 ordered LP and labs, prescribed prednisone.     MS profile IgG syn rate 5.9, zero OCB, proteine 58  MOG, NMO neg    HA frequency once a week.  Mild intensity.  Tolerating TPM.      Vision changes waxing and waning.  Hearing B muffled sensation.  Tinnitus.  Trouble with low sounds.      Problem history:    Three months started having unilateral hemifield vision loss, visual obscurations last from 30 minutes to a day.  Mild pressure in forehead assoc with sx.     Easter developed decreased hearing L ear. Decreased by 30%.  Sensation of ear is muffled.  Trouble with loud noises.      Right ear developing muffled sound.      Dizziness in mornings.       Eyes examined by  Dr Yo and unremarkable.      MRI Brain, my review of films, 6/22/19 multiple CC lesions and PVWM, BG    Reviewed medical records:    Pt reported visual disturbances in early 2019.  Decreased left peripheral vision and flashes of light in right peripheral vision,  dizziness.  Sz in childhood resolved by 3 yoa.    Reports hearing loss bilaterally, worse on the left      Past Medical History:   Diagnosis Date   • Abdominal cramping, generalized     mostly after eating accompanied by loose stools   • Dizzy    • GERD (gastroesophageal reflux disease)    • Hearing loss     left ear at first beginning at easter after diving then began in right ear   • Migraine    • Numbness     random numbness and tingling various parts of his body   • Seizures (CMS/HCC)     as a kid; last seizure at age 3    • Vision changes     left visual field \"blacks out\"     History reviewed. No pertinent family history.  Social History     Socioeconomic History   • Marital status: Single     Spouse name: Not on file   • Number of children: Not " "on file   • Years of education: Not on file   • Highest education level: Not on file   Tobacco Use   • Smoking status: Never Smoker   • Smokeless tobacco: Never Used   Substance and Sexual Activity   • Alcohol use: No     Frequency: Never   • Drug use: Defer   • Sexual activity: Defer       Review of Systems   Constitutional: Negative for activity change, fatigue and unexpected weight change.   HENT: Positive for hearing loss. Negative for facial swelling, tinnitus, trouble swallowing and voice change.    Eyes: Positive for visual disturbance. Negative for photophobia and pain.   Respiratory: Negative for apnea, cough and choking.    Cardiovascular: Negative for chest pain.   Gastrointestinal: Negative for constipation, nausea and vomiting.   Endocrine: Negative for cold intolerance.   Genitourinary: Negative for difficulty urinating, frequency and urgency.   Musculoskeletal: Negative for arthralgias, back pain, gait problem, myalgias, neck pain and neck stiffness.   Skin: Negative for rash.   Allergic/Immunologic: Negative for immunocompromised state.   Neurological: Positive for dizziness, numbness and headaches. Negative for tremors, seizures, syncope, facial asymmetry, speech difficulty, weakness and light-headedness.   Hematological: Negative for adenopathy.   Psychiatric/Behavioral: Negative for confusion, decreased concentration, hallucinations and sleep disturbance. The patient is not nervous/anxious.        Objective     Vitals:    08/02/19 0819   BP: 98/70   BP Location: Left arm   Patient Position: Sitting   Cuff Size: Adult   Pulse: 53   Resp: 16   SpO2: 99%   Weight: 79.8 kg (176 lb)   Height: 188 cm (74\")       Neurologic Exam     Mental Status   Registration: recalls 3 of 3 objects. Recall at 5 minutes: recalls 3 of 3 objects. Follows 3 step commands.   Attention: normal. Concentration: normal.   Level of consciousness: alert  Knowledge: good and consistent with education.   Able to name object. Able " to read. Able to repeat. Able to write. Normal comprehension.     Cranial Nerves     CN II   Visual fields full to confrontation.   Visual acuity: normal  Right visual field deficit: none  Left visual field deficit: none     CN III, IV, VI   Right pupil: Shape: regular. Reactivity: brisk. Consensual response: intact.   Left pupil: Shape: regular. Reactivity: brisk. Consensual response: intact.   Nystagmus: none   Diplopia: none  Ophthalmoparesis: none  Upgaze: normal  Downgaze: normal  Conjugate gaze: present  Vestibulo-ocular reflex: present    CN V   Facial sensation intact.   Right corneal reflex: normal  Left corneal reflex: normal    CN VII   Right facial weakness: none  Left facial weakness: none    CN VIII   Hearing: impaired (L > R )    CN IX, X   Palate: symmetric  Right gag reflex: normal  Left gag reflex: normal    CN XI   Right sternocleidomastoid strength: normal  Left sternocleidomastoid strength: normal    CN XII   Tongue: not atrophic  Fasciculations: absent  Tongue deviation: none    Motor Exam   Muscle bulk: normal  Overall muscle tone: normal  Right arm tone: normal  Left arm tone: normal  Right leg tone: normal  Left leg tone: normal    Sensory Exam   Light touch normal.   Vibration normal.   Proprioception normal.   Pinprick normal.     Gait, Coordination, and Reflexes     Tremor   Resting tremor: absent  Intention tremor: absent  Action tremor: absent    Reflexes   Reflexes 2+ except as noted.       Physical Exam   Constitutional: He appears well-developed and well-nourished.   Nursing note and vitals reviewed.      Hospital Outpatient Visit on 07/19/2019   Component Date Value Ref Range Status   • ACE CSF 07/19/2019 0.8  0.0 - 2.8 U/L Final    Results of this test are labeled for research purposes only by the  assay's . The performance characteristics of this assay  have not been established by the . The result should not  be used for treatment or for diagnostic  purposes without confirmation  of the diagnosis by another medically established diagnostic product  or procedure. The performance characteristics were determined by  [facility].   • AFB Culture 07/19/2019 No AFB isolated at 1 week   Preliminary   • AFB Stain 07/19/2019 No acid fast bacilli seen on direct smear   Preliminary   • Cryptococcal Antigen, CSF 07/19/2019 Negative  Negative Final   • CSF Culture 07/19/2019 No growth at 3 days   Final   • Gram Stain 07/19/2019 No WBCs or organisms seen   Final   • Glucose, CSF 07/19/2019 57  40 - 70 mg/dL Final   • IgG, CSF 07/19/2019 5.2  0.0 - 8.6 mg/dL Final   • Albumin, CSF 07/19/2019 32  11 - 48 mg/dL Final   • Albumin 07/19/2019 4.6  3.5 - 5.5 g/dL Final   • IgG 07/19/2019 1050  700 - 1600 mg/dL Final   • IgG/Alb Ratio, CSF 07/19/2019 0.16  0.00 - 0.25 Final   • IgG Index, CSF 07/19/2019 0.7  0.0 - 0.7 Final   • IgG Synthesis Rate, CSF 07/19/2019 5.9* -9.9 TO +3.3 mg/day Final   • Oligoclonal Bands, CSF 07/19/2019 Comment   Final    Comment: Zero (0) oligoclonal bands were observed in the CSF.  Interpretation:   Criteria for Positivity: Four (4) or more oligoclonal bands observed   only in the CSF have been shown to be most consistent with MS   using our method. [Nimco AS, Martinez EL, Sherrell TERRY, and   Ayan JA: Cerebrospinal Fluid Oligoclonal Bands in the Diagnosis   of Multiple Sclerosis. Am J Clin Pathol 120(5):672-675, 2003].   Oligoclonal bands that are present only in the CSF have been   associated with a variety of inflammatory brain diseases such as   multiple sclerosis (MS), subacute encephalitis, neurosyphilis, etc.   Increased IgG in the CSF is not specific for MS, but is an indication   of chronic neural inflammation. Clinical correlation indicated.   Approximately 2-3% of clinically confirmed MS patients show little   or no evidence of oligoclonal bands in the CSF; however oligoclonal   bands may develop as the disease progresses.   Oligoclonal  Banding testing performed using Isoelectric Focusing                              (IEF) and immunoblotting methodology.   • CSF/Serum Albumin Index 07/19/2019 7  0 - 8 Final             Relationship to blood-brain barrier:            Consistent with an intact barrier        <9            Slight impairment                   9 -  14            Moderate impairment                14 -  30            Severe impairment                  30 - 100            Complete breakdown                     >100   • Case Report 07/19/2019    Final                    Value:Non-gynecologic Cytology                          Case: [id number]                                  Authorizing Provider:  Jomar Garcia MD       Collected:           07/19/2019 03:32 PM          Ordering Location:     [facility]   Received:            07/20/2019 04:19 AM                                 XRAY                                                                         Pathologist:           Toño North MD                                                           Specimen:    Lumbar Puncture                                                                           • Final Diagnosis 07/19/2019    Final                    Value:This result contains rich text formatting which cannot be displayed here.   • Protein, Total (CSF) 07/19/2019 58.1* 15.0 - 45.0 mg/dL Final   • VDRL, Quantitative, CSF 07/19/2019 Non Reactive  Non Nataly:<1:1 Final   • WBC, CSF 07/19/2019 3  0 - 5 /mm3 Final   • RBC, CSF 07/19/2019 53* 0 - 5 /mm3 Final   • Color, CSF 07/19/2019 Colorless  Colorless Final   • Supernatant Color, CSF 07/19/2019 Colorless  Colorless Final   • Appearance, CSF 07/19/2019 Clear  Clear Final   • Volume, CSF 07/19/2019 9.0  mL Final   • Tube Number, CSF 07/19/2019 1   Final   • WBC, CSF 07/19/2019 3  0 - 5 /mm3 Final   • RBC, CSF 07/19/2019 7* 0 - 5 /mm3 Final   • Color, CSF 07/19/2019 Colorless  Colorless Final   • Supernatant Color, CSF  07/19/2019 Colorless  Colorless Final   • Appearance, CSF 07/19/2019 Clear  Clear Final   • Volume, CSF 07/19/2019 9.0  mL Final   • Tube Number, CSF 07/19/2019 4   Final         Assessment/Plan     Problem List Items Addressed This Visit        Cardiovascular and Mediastinum    Ocular migraine    Current Assessment & Plan     Headaches are improving with treatment.  Continue current treatment regimen.             Relevant Medications    topiramate (TOPAMAX) 100 MG tablet    topiramate (TOPAMAX) 25 MG tablet       Nervous and Auditory    Multiple sclerosis (CMS/HCC) - Primary    Current Assessment & Plan     Likely dx of RRMS    Start Ocrevus     Hep pnl              Relevant Orders    Hepatitis Panel, Acute      Other Visit Diagnoses     Sensorineural hearing loss (SNHL) of both ears        Relevant Orders    Ambulatory Referral to ENT (Otolaryngology)             No Follow-up on file.

## 2019-08-09 ENCOUNTER — HOSPITAL ENCOUNTER (EMERGENCY)
Facility: HOSPITAL | Age: 24
Discharge: HOME | End: 2019-08-09
Attending: EMERGENCY MEDICINE | Admitting: EMERGENCY MEDICINE

## 2019-08-09 ENCOUNTER — APPOINTMENT (OUTPATIENT)
Dept: CT IMAGING | Facility: HOSPITAL | Age: 24
End: 2019-08-09

## 2019-08-09 VITALS
BODY MASS INDEX: 23.1 KG/M2 | DIASTOLIC BLOOD PRESSURE: 75 MMHG | TEMPERATURE: 98.1 F | WEIGHT: 180 LBS | RESPIRATION RATE: 16 BRPM | HEIGHT: 74 IN | SYSTOLIC BLOOD PRESSURE: 120 MMHG | OXYGEN SATURATION: 99 % | HEART RATE: 80 BPM

## 2019-08-09 DIAGNOSIS — M51.27 LUMBOSACRAL DISC HERNIATION: ICD-10-CM
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
## 2019-08-09 DIAGNOSIS — S39.012A ACUTE MYOFASCIAL STRAIN OF LUMBOSACRAL REGION, INITIAL ENCOUNTER: ICD-10-CM

## 2019-08-09 PROCEDURE — 72131 CT LUMBAR SPINE W/O DYE: CPT

## 2019-08-09 PROCEDURE — 96375 TX/PRO/DX INJ NEW DRUG ADDON: CPT

## 2019-08-09 PROCEDURE — 99283 EMERGENCY DEPT VISIT LOW MDM: CPT

## 2019-08-09 PROCEDURE — 25010000002 METHYLPREDNISOLONE PER 125 MG: Performed by: EMERGENCY MEDICINE

## 2019-08-09 PROCEDURE — 25010000002 KETOROLAC TROMETHAMINE PER 15 MG: Performed by: EMERGENCY MEDICINE

## 2019-08-09 PROCEDURE — 96374 THER/PROPH/DIAG INJ IV PUSH: CPT

## 2019-08-09 RX ORDER — ACETAMINOPHEN 500 MG
1000 TABLET ORAL ONCE
Status: COMPLETED | OUTPATIENT
Start: 2019-08-09 | End: 2019-08-09

## 2019-08-09 RX ORDER — NAPROXEN 500 MG/1
500 TABLET ORAL 2 TIMES DAILY WITH MEALS
Qty: 14 TABLET | Refills: 0 | Status: SHIPPED | OUTPATIENT
Start: 2019-08-09

## 2019-08-09 RX ORDER — PREDNISONE 20 MG/1
20 TABLET ORAL DAILY
Qty: 5 TABLET | Refills: 0 | Status: SHIPPED | OUTPATIENT
Start: 2019-08-09 | End: 2021-12-21

## 2019-08-09 RX ORDER — METHYLPREDNISOLONE SODIUM SUCCINATE 125 MG/2ML
125 INJECTION, POWDER, LYOPHILIZED, FOR SOLUTION INTRAMUSCULAR; INTRAVENOUS ONCE
Status: COMPLETED | OUTPATIENT
Start: 2019-08-09 | End: 2019-08-09

## 2019-08-09 RX ORDER — KETOROLAC TROMETHAMINE 15 MG/ML
15 INJECTION, SOLUTION INTRAMUSCULAR; INTRAVENOUS ONCE
Status: COMPLETED | OUTPATIENT
Start: 2019-08-09 | End: 2019-08-09

## 2019-08-09 RX ADMIN — METHYLPREDNISOLONE SODIUM SUCCINATE 125 MG: 125 INJECTION, POWDER, FOR SOLUTION INTRAMUSCULAR; INTRAVENOUS at 10:16

## 2019-08-09 RX ADMIN — KETOROLAC TROMETHAMINE 15 MG: 15 INJECTION, SOLUTION INTRAMUSCULAR; INTRAVENOUS at 10:16

## 2019-08-09 RX ADMIN — ACETAMINOPHEN 1000 MG: 500 TABLET, FILM COATED ORAL at 10:16
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
## 2019-08-30 LAB
MYCOBACTERIUM SPEC CULT: NORMAL
NIGHT BLUE STAIN TISS: NORMAL

## 2019-09-09 ENCOUNTER — TELEPHONE (OUTPATIENT)
Dept: NEUROLOGY | Facility: CLINIC | Age: 24
End: 2019-09-09

## 2019-09-09 DIAGNOSIS — G35 MULTIPLE SCLEROSIS (HCC): Primary | ICD-10-CM

## 2019-09-09 NOTE — TELEPHONE ENCOUNTER
----- Message from Tawana Mccann MA sent at 9/9/2019  4:27 PM EDT -----  Regarding: FW: referral  Contact: [phone]  Could you please place the referral for this patient to [facility]. Thanks!   ----- Message -----  From: Katharine Harman  Sent: 9/9/2019   3:35 PM  To: [facility] Clinical Pool  Subject: referral                                         Patient states his insurance requires him to obtain a referral for [facility]. He does not have a PCP and request Dr. Garcia, refer him.

## 2019-10-24 ENCOUNTER — TELEPHONE (OUTPATIENT)
Dept: NEUROLOGY | Facility: CLINIC | Age: 24
End: 2019-10-24

## 2019-10-24 NOTE — TELEPHONE ENCOUNTER
----- Message from Codie Arredondo sent at 10/22/2019  4:14 PM EDT -----  Contact: [phone]  Surjit Garcia left a vm asking for a call back in regards to getting a referral. It looks like pt may be a Jose pt now. Please advise.

## 2019-10-24 NOTE — TELEPHONE ENCOUNTER
Pt called regarding referral to [facility]. Informed pt that Dr. Jose VARGAS called and completed registration and should be receiving call from [facility] regarding scheduling appt. Pt stated verbal understanding. Thanks.

## 2019-10-24 NOTE — TELEPHONE ENCOUNTER
Attempted to call patient to get some more information, but no answer. Left him a detailed vm, but looks like he was calling about a referral put in by  regarding being seen with [facility].       Carmencita,  This is the last communication in the Neurology referral from 's MATawana:  Called [phone], spoke with Tete, got patient registered, they are to reach out to the patient for him to schedule.   Just a heads up in case Noé calls your office today.

## 2019-10-25 ENCOUNTER — TELEPHONE (OUTPATIENT)
Dept: NEUROLOGY | Facility: CLINIC | Age: 24
End: 2019-10-25

## 2019-10-25 NOTE — TELEPHONE ENCOUNTER
Pt called regarding scheduling with [facility] and why he hasn't received a call concerning scheduled appt. Called pt and informed that I reached out to [facility] regarding scheduling. Informed pt that [facility] is awaiting financial clearance in order to schedule and will call once approved. Pt stated verbal understanding and asked for contact number for [facility] and provided pt with number. ([phone]). Thanks.

## 2019-11-11 ENCOUNTER — TELEPHONE (OUTPATIENT)
Dept: NEUROLOGY | Facility: CLINIC | Age: 24
End: 2019-11-11

## 2019-11-11 NOTE — TELEPHONE ENCOUNTER
Called and spoke to pt regarding vm received pertaining to [facility] needing referral order. Pt stated that he spoke with Ester with [facility] and was told they were needing a referral order. Informed pt that I will fax order to [facility]. Pt stated verbal understanding and appreciation. Called [facility] to inquire fax number. Faxed referral order to ([phone]) in Attn to Ester. Thanks.

## 2020-03-25 ENCOUNTER — TELEPHONE (OUTPATIENT)
Dept: NEUROLOGY | Facility: CLINIC | Age: 25
End: 2020-03-25

## 2020-03-25 NOTE — TELEPHONE ENCOUNTER
Patient continued care at  then [facility]. I informed him that we are unable to provide him with a letter to work from home because patient is no longer under the care of Dr. Garcia. He will need to reach out to  or [facility] for letter. Patient verbalized understanding.  -TMT 03/25/2020

## 2020-03-25 NOTE — TELEPHONE ENCOUNTER
----- Message from Jomar Garcia MD sent at 3/23/2020  9:12 PM EDT -----  Regarding: RE: LETTER  Contact: [phone]  I cannot write a letter he is no longer under my care after seeing other physicians.    ----- Message -----  From: Paul Ramirez MA  Sent: 3/23/2020   4:11 PM EDT  To: Jomar Garcia MD  Subject: FW: LETTER                                       Are you agreeable with giving work from home letter? Patient went to  and [place] after seeing you and is unable to get note from them. Please advise.  ----- Message -----  From: Katharine Harman  Sent: 3/23/2020   4:06 PM EDT  To: [facility]  Subject: LETTER                                           Patient request letter stating he should work from home during coronavirus. Patient last seen in July 2019.

## 2021-12-21 ENCOUNTER — HOSPITAL ENCOUNTER (EMERGENCY)
Facility: HOSPITAL | Age: 26
Discharge: HOME | End: 2021-12-21
Attending: EMERGENCY MEDICINE | Admitting: EMERGENCY MEDICINE

## 2021-12-21 ENCOUNTER — APPOINTMENT (OUTPATIENT)
Dept: CT IMAGING | Facility: HOSPITAL | Age: 26
End: 2021-12-21

## 2021-12-21 VITALS
HEIGHT: 74 IN | BODY MASS INDEX: 23.74 KG/M2 | RESPIRATION RATE: 18 BRPM | HEART RATE: 64 BPM | DIASTOLIC BLOOD PRESSURE: 73 MMHG | TEMPERATURE: 97.6 F | WEIGHT: 185 LBS | OXYGEN SATURATION: 99 % | SYSTOLIC BLOOD PRESSURE: 111 MMHG

## 2021-12-21 DIAGNOSIS — R31.9 HEMATURIA, UNSPECIFIED TYPE: ICD-10-CM

## 2021-12-21 DIAGNOSIS — N20.1 URETEROLITHIASIS: Primary | ICD-10-CM

## 2021-12-21 DIAGNOSIS — N13.39 OTHER HYDRONEPHROSIS: ICD-10-CM

## 2021-12-21 LAB
ALBUMIN SERPL-MCNC: 4.5 G/DL (ref 3.5–5.2)
ALBUMIN/GLOB SERPL: 1.8 G/DL
ALP SERPL-CCNC: 58 U/L (ref 39–117)
ALT SERPL W P-5'-P-CCNC: 22 U/L (ref 1–41)
ANION GAP SERPL CALCULATED.3IONS-SCNC: 12 MMOL/L (ref 5–15)
AST SERPL-CCNC: 21 U/L (ref 1–40)
BACTERIA UR QL AUTO: ABNORMAL /HPF
BASOPHILS # BLD AUTO: 0.05 10*3/MM3 (ref 0–0.2)
BASOPHILS NFR BLD AUTO: 0.6 % (ref 0–1.5)
BILIRUB SERPL-MCNC: 1.1 MG/DL (ref 0–1.2)
BILIRUB UR QL STRIP: NEGATIVE
BUN SERPL-MCNC: 14 MG/DL (ref 6–20)
BUN/CREAT SERPL: 12.1 (ref 7–25)
CALCIUM SPEC-SCNC: 9 MG/DL (ref 8.6–10.5)
CHLORIDE SERPL-SCNC: 103 MMOL/L (ref 98–107)
CLARITY UR: ABNORMAL
CO2 SERPL-SCNC: 24 MMOL/L (ref 22–29)
COLOR UR: YELLOW
CREAT SERPL-MCNC: 1.16 MG/DL (ref 0.76–1.27)
DEPRECATED RDW RBC AUTO: 38.8 FL (ref 37–54)
EOSINOPHIL # BLD AUTO: 0.16 10*3/MM3 (ref 0–0.4)
EOSINOPHIL NFR BLD AUTO: 1.9 % (ref 0.3–6.2)
ERYTHROCYTE [DISTWIDTH] IN BLOOD BY AUTOMATED COUNT: 11.6 % (ref 12.3–15.4)
GFR SERPL CREATININE-BSD FRML MDRD: 75 ML/MIN/1.73
GLOBULIN UR ELPH-MCNC: 2.5 GM/DL
GLUCOSE SERPL-MCNC: 124 MG/DL (ref 65–99)
GLUCOSE UR STRIP-MCNC: NEGATIVE MG/DL
HCT VFR BLD AUTO: 44.8 % (ref 37.5–51)
HGB BLD-MCNC: 15.4 G/DL (ref 13–17.7)
HGB UR QL STRIP.AUTO: ABNORMAL
HOLD SPECIMEN: NORMAL
HYALINE CASTS UR QL AUTO: ABNORMAL /LPF
IMM GRANULOCYTES # BLD AUTO: 0.03 10*3/MM3 (ref 0–0.05)
IMM GRANULOCYTES NFR BLD AUTO: 0.3 % (ref 0–0.5)
KETONES UR QL STRIP: NEGATIVE
LEUKOCYTE ESTERASE UR QL STRIP.AUTO: NEGATIVE
LIPASE SERPL-CCNC: 27 U/L (ref 13–60)
LYMPHOCYTES # BLD AUTO: 2.91 10*3/MM3 (ref 0.7–3.1)
LYMPHOCYTES NFR BLD AUTO: 33.8 % (ref 19.6–45.3)
MCH RBC QN AUTO: 31.3 PG (ref 26.6–33)
MCHC RBC AUTO-ENTMCNC: 34.4 G/DL (ref 31.5–35.7)
MCV RBC AUTO: 91.1 FL (ref 79–97)
MONOCYTES # BLD AUTO: 0.98 10*3/MM3 (ref 0.1–0.9)
MONOCYTES NFR BLD AUTO: 11.4 % (ref 5–12)
NEUTROPHILS NFR BLD AUTO: 4.48 10*3/MM3 (ref 1.7–7)
NEUTROPHILS NFR BLD AUTO: 52 % (ref 42.7–76)
NITRITE UR QL STRIP: NEGATIVE
NRBC BLD AUTO-RTO: 0 /100 WBC (ref 0–0.2)
PH UR STRIP.AUTO: 5.5 [PH] (ref 5–8)
PLATELET # BLD AUTO: 189 10*3/MM3 (ref 140–450)
PMV BLD AUTO: 10.8 FL (ref 6–12)
POTASSIUM SERPL-SCNC: 4 MMOL/L (ref 3.5–5.2)
PROT SERPL-MCNC: 7 G/DL (ref 6–8.5)
PROT UR QL STRIP: ABNORMAL
RBC # BLD AUTO: 4.92 10*6/MM3 (ref 4.14–5.8)
RBC # UR STRIP: ABNORMAL /HPF
REF LAB TEST METHOD: ABNORMAL
SODIUM SERPL-SCNC: 139 MMOL/L (ref 136–145)
SP GR UR STRIP: 1.02 (ref 1–1.03)
SQUAMOUS #/AREA URNS HPF: ABNORMAL /HPF
UROBILINOGEN UR QL STRIP: ABNORMAL
WBC # UR STRIP: ABNORMAL /HPF
WBC NRBC COR # BLD: 8.61 10*3/MM3 (ref 3.4–10.8)
WHOLE BLOOD HOLD SPECIMEN: NORMAL
WHOLE BLOOD HOLD SPECIMEN: NORMAL

## 2021-12-21 PROCEDURE — 96374 THER/PROPH/DIAG INJ IV PUSH: CPT

## 2021-12-21 PROCEDURE — 25010000002 HYDROMORPHONE PER 4 MG: Performed by: EMERGENCY MEDICINE

## 2021-12-21 PROCEDURE — 81001 URINALYSIS AUTO W/SCOPE: CPT | Performed by: EMERGENCY MEDICINE

## 2021-12-21 PROCEDURE — 99284 EMERGENCY DEPT VISIT MOD MDM: CPT

## 2021-12-21 PROCEDURE — 80053 COMPREHEN METABOLIC PANEL: CPT | Performed by: EMERGENCY MEDICINE

## 2021-12-21 PROCEDURE — 25010000002 KETOROLAC TROMETHAMINE PER 15 MG: Performed by: EMERGENCY MEDICINE

## 2021-12-21 PROCEDURE — 96375 TX/PRO/DX INJ NEW DRUG ADDON: CPT

## 2021-12-21 PROCEDURE — 74176 CT ABD & PELVIS W/O CONTRAST: CPT

## 2021-12-21 PROCEDURE — 85025 COMPLETE CBC W/AUTO DIFF WBC: CPT | Performed by: EMERGENCY MEDICINE

## 2021-12-21 PROCEDURE — 83690 ASSAY OF LIPASE: CPT | Performed by: EMERGENCY MEDICINE

## 2021-12-21 PROCEDURE — 25010000002 ONDANSETRON PER 1 MG: Performed by: EMERGENCY MEDICINE

## 2021-12-21 RX ORDER — HYDROMORPHONE HYDROCHLORIDE 1 MG/ML
0.5 INJECTION, SOLUTION INTRAMUSCULAR; INTRAVENOUS; SUBCUTANEOUS ONCE
Status: COMPLETED | OUTPATIENT
Start: 2021-12-21 | End: 2021-12-21

## 2021-12-21 RX ORDER — ONDANSETRON 2 MG/ML
4 INJECTION INTRAMUSCULAR; INTRAVENOUS ONCE
Status: COMPLETED | OUTPATIENT
Start: 2021-12-21 | End: 2021-12-21

## 2021-12-21 RX ORDER — TAMSULOSIN HYDROCHLORIDE 0.4 MG/1
1 CAPSULE ORAL DAILY
Qty: 10 CAPSULE | Refills: 0 | Status: SHIPPED | OUTPATIENT
Start: 2021-12-21

## 2021-12-21 RX ORDER — KETOROLAC TROMETHAMINE 30 MG/ML
15 INJECTION, SOLUTION INTRAMUSCULAR; INTRAVENOUS ONCE
Status: COMPLETED | OUTPATIENT
Start: 2021-12-21 | End: 2021-12-21

## 2021-12-21 RX ORDER — SODIUM CHLORIDE 9 MG/ML
10 INJECTION INTRAVENOUS AS NEEDED
Status: DISCONTINUED | OUTPATIENT
Start: 2021-12-21 | End: 2021-12-21 | Stop reason: HOSPADM

## 2021-12-21 RX ORDER — HYDROCODONE BITARTRATE AND ACETAMINOPHEN 5; 325 MG/1; MG/1
1 TABLET ORAL EVERY 6 HOURS PRN
Qty: 12 TABLET | Refills: 0 | Status: SHIPPED | OUTPATIENT
Start: 2021-12-21

## 2021-12-21 RX ORDER — HYDROCODONE BITARTRATE AND ACETAMINOPHEN 5; 325 MG/1; MG/1
1 TABLET ORAL ONCE
Status: COMPLETED | OUTPATIENT
Start: 2021-12-21 | End: 2021-12-21

## 2021-12-21 RX ORDER — ONDANSETRON 4 MG/1
4 TABLET, FILM COATED ORAL EVERY 8 HOURS PRN
Qty: 15 TABLET | Refills: 0 | Status: SHIPPED | OUTPATIENT
Start: 2021-12-21

## 2021-12-21 RX ADMIN — HYDROCODONE BITARTRATE AND ACETAMINOPHEN 1 TABLET: 5; 325 TABLET ORAL at 08:41

## 2021-12-21 RX ADMIN — KETOROLAC TROMETHAMINE 15 MG: 30 INJECTION, SOLUTION INTRAMUSCULAR at 07:01

## 2021-12-21 RX ADMIN — HYDROMORPHONE HYDROCHLORIDE 0.5 MG: 1 INJECTION, SOLUTION INTRAMUSCULAR; INTRAVENOUS; SUBCUTANEOUS at 07:01

## 2021-12-21 RX ADMIN — ONDANSETRON 4 MG: 2 INJECTION INTRAMUSCULAR; INTRAVENOUS at 07:01

## 2021-12-21 RX ADMIN — SODIUM CHLORIDE 1000 ML: 9 INJECTION, SOLUTION INTRAVENOUS at 07:00

## 2021-12-21 NOTE — ED PROVIDER NOTES
"Subjective   26-year-old male presents for evaluation of left flank pain and nausea/vomiting.  The patient denies any prior history of kidney stones.  He states that he was awakened from his sleep this morning approximately 1 to 2 hours before calling EMS with severe left-sided flank pain.  The pain was nonradiating in nature and seemed to \"come in waves.\"  He endorses accompanying nausea and vomiting.  No fevers.  No urinary symptoms.  Pain was 8 out of 10 in severity at its worst.  He called EMS and was brought to our facility to be evaluated.  No similar episodes before in the past.          Review of Systems   Gastrointestinal: Positive for nausea and vomiting.   Genitourinary: Positive for flank pain.   All other systems reviewed and are negative.      Past Medical History:   Diagnosis Date   • Abdominal cramping, generalized     mostly after eating accompanied by loose stools   • Dizzy    • GERD (gastroesophageal reflux disease)    • Hearing loss     left ear at first beginning at [facility] after [place] then began in right ear   • Migraine    • Numbness     random numbness and tingling various parts of his body   • Seizures (HCC)     as a kid; last seizure at age 3    • Vision changes     left visual field \"blacks out\"       No Known Allergies    History reviewed. No pertinent surgical history.    History reviewed. No pertinent family history.    Social History     Socioeconomic History   • Marital status: Single   Tobacco Use   • Smoking status: Never Smoker   • Smokeless tobacco: Never Used   Substance and Sexual Activity   • Alcohol use: No   • Drug use: Defer   • Sexual activity: Defer           Objective   Physical Exam  Vitals and nursing note reviewed.   Constitutional:       Appearance: He is well-developed. He is not diaphoretic.      Comments: Uncomfortable appearing male   HENT:      Head: Normocephalic and atraumatic.   Neck:      Vascular: No JVD.   Cardiovascular:      Rate and Rhythm: Normal rate " and regular rhythm.      Heart sounds: Normal heart sounds. No murmur heard.  No friction rub. No gallop.    Pulmonary:      Effort: Pulmonary effort is normal. No respiratory distress.      Breath sounds: Normal breath sounds. No wheezing or rales.   Abdominal:      General: Bowel sounds are normal. There is no distension.      Palpations: Abdomen is soft. There is no mass.      Tenderness: There is no abdominal tenderness. There is no guarding.      Comments: No focal abdominal tenderness, no peritoneal signs, no pain out of proportion to exam   Genitourinary:     Comments: No CVA tenderness present  Musculoskeletal:         General: Normal range of motion.      Cervical back: Normal range of motion.   Skin:     General: Skin is warm and dry.      Coloration: Skin is not pale.      Findings: No erythema or rash.      Comments: No dermatomal rash noted to left flank   Neurological:      Mental Status: He is alert and oriented to person, place, and time.   Psychiatric:         Thought Content: Thought content normal.         Judgment: Judgment normal.         Procedures           ED Course  ED Course as of 12/21/21 0817   Tue Dec 21, 2021   0816 26-year-old male presents for evaluation of left flank pain and nausea/vomiting this morning.  On arrival to the ED, the patient is uncomfortable appearing.  Nonsurgical abdomen.  No dermatomal rash noted to left flank.  No CVA tenderness noted.  Clinical presentation concerning for kidney stone.  Labs are unrevealing.  CT revealed a 3 mm stone at left UVJ.  Upon reevaluation following medications and IV fluids, the patient looks and feels much improved.  Reassured.  Scripts for Norco, Zofran, and Flomax.  The patient was referred to Dr. Tinsley of urology and will follow-up within the next week.  Agreeable with plan and given appropriate strict return precautions. [DD]      ED Course User Index  [DD] Sathya Ivy MD                                       Recent  Results (from the past 24 hour(s))   Comprehensive Metabolic Panel    Collection Time: 12/21/21  6:48 AM    Specimen: Blood   Result Value Ref Range    Glucose 124 (H) 65 - 99 mg/dL    BUN 14 6 - 20 mg/dL    Creatinine 1.16 0.76 - 1.27 mg/dL    Sodium 139 136 - 145 mmol/L    Potassium 4.0 3.5 - 5.2 mmol/L    Chloride 103 98 - 107 mmol/L    CO2 24.0 22.0 - 29.0 mmol/L    Calcium 9.0 8.6 - 10.5 mg/dL    Total Protein 7.0 6.0 - 8.5 g/dL    Albumin 4.50 3.50 - 5.20 g/dL    ALT (SGPT) 22 1 - 41 U/L    AST (SGOT) 21 1 - 40 U/L    Alkaline Phosphatase 58 39 - 117 U/L    Total Bilirubin 1.1 0.0 - 1.2 mg/dL    eGFR Non African Amer 75 >60 mL/min/1.73    Globulin 2.5 gm/dL    A/G Ratio 1.8 g/dL    BUN/Creatinine Ratio 12.1 7.0 - 25.0    Anion Gap 12.0 5.0 - 15.0 mmol/L   Lipase    Collection Time: 12/21/21  6:48 AM    Specimen: Blood   Result Value Ref Range    Lipase 27 13 - 60 U/L   Green Top (Gel)    Collection Time: 12/21/21  6:48 AM   Result Value Ref Range    Extra Tube Hold for add-ons.    Lavender Top    Collection Time: 12/21/21  6:48 AM   Result Value Ref Range    Extra Tube hold for add-on    Gold Top - SST    Collection Time: 12/21/21  6:48 AM   Result Value Ref Range    Extra Tube Hold for add-ons.    Gray Top    Collection Time: 12/21/21  6:48 AM   Result Value Ref Range    Extra Tube Hold for add-ons.    Light Blue Top    Collection Time: 12/21/21  6:48 AM   Result Value Ref Range    Extra Tube hold for add-on    CBC Auto Differential    Collection Time: 12/21/21  6:48 AM    Specimen: Blood   Result Value Ref Range    WBC 8.61 3.40 - 10.80 10*3/mm3    RBC 4.92 4.14 - 5.80 10*6/mm3    Hemoglobin 15.4 13.0 - 17.7 g/dL    Hematocrit 44.8 37.5 - 51.0 %    MCV 91.1 79.0 - 97.0 fL    MCH 31.3 26.6 - 33.0 pg    MCHC 34.4 31.5 - 35.7 g/dL    RDW 11.6 (L) 12.3 - 15.4 %    RDW-SD 38.8 37.0 - 54.0 fl    MPV 10.8 6.0 - 12.0 fL    Platelets 189 140 - 450 10*3/mm3    Neutrophil % 52.0 42.7 - 76.0 %    Lymphocyte % 33.8 19.6  - 45.3 %    Monocyte % 11.4 5.0 - 12.0 %    Eosinophil % 1.9 0.3 - 6.2 %    Basophil % 0.6 0.0 - 1.5 %    Immature Grans % 0.3 0.0 - 0.5 %    Neutrophils, Absolute 4.48 1.70 - 7.00 10*3/mm3    Lymphocytes, Absolute 2.91 0.70 - 3.10 10*3/mm3    Monocytes, Absolute 0.98 (H) 0.10 - 0.90 10*3/mm3    Eosinophils, Absolute 0.16 0.00 - 0.40 10*3/mm3    Basophils, Absolute 0.05 0.00 - 0.20 10*3/mm3    Immature Grans, Absolute 0.03 0.00 - 0.05 10*3/mm3    nRBC 0.0 0.0 - 0.2 /100 WBC   Urinalysis With Microscopic If Indicated (No Culture) - Urine, Clean Catch    Collection Time: 12/21/21  8:09 AM    Specimen: Urine, Clean Catch   Result Value Ref Range    Color, UA Yellow Yellow, Straw    Appearance, UA Cloudy (A) Clear    pH, UA 5.5 5.0 - 8.0    Specific Gravity, UA 1.020 1.001 - 1.030    Glucose, UA Negative Negative    Ketones, UA Negative Negative    Bilirubin, UA Negative Negative    Blood, UA Large (3+) (A) Negative    Protein, UA 30 mg/dL (1+) (A) Negative    Leuk Esterase, UA Negative Negative    Nitrite, UA Negative Negative    Urobilinogen, UA 1.0 E.U./dL 0.2 - 1.0 E.U./dL   Urinalysis, Microscopic Only - Urine, Clean Catch    Collection Time: 12/21/21  8:09 AM    Specimen: Urine, Clean Catch   Result Value Ref Range    RBC, UA Too Numerous to Count (A) None Seen, 0-2 /HPF    WBC, UA 3-5 (A) None Seen, 0-2 /HPF    Bacteria, UA None Seen None Seen, Trace /HPF    Squamous Epithelial Cells, UA None Seen None Seen, 0-2 /HPF    Hyaline Casts, UA 0-6 0 - 6 /LPF    Methodology Automated Microscopy      Note: In addition to lab results from this visit, the labs listed above may include labs taken at another facility or during a different encounter within the last 24 hours. Please correlate lab times with ED admission and discharge times for further clarification of the services performed during this visit.    CT Abdomen Pelvis Without Contrast   Final Result      1.  Mild prominence of the left kidney with mild left  hydronephrosis and hydroureter extending to the level of the left UVJ where a 3 mm calculus is present.            Signer Name: SUDHIR KRAMER MD    Signed: 12/21/2021 7:45 AM    Workstation Name: [id number]     [facility]        Vitals:    12/21/21 0700 12/21/21 0800 12/21/21 0829 12/21/21 0830   BP: 123/85 126/73  111/73   BP Location:       Patient Position:       Pulse:       Resp:       Temp:       TempSrc:       SpO2: 97% 100% 99%    Weight:       Height:         Medications   sodium chloride 0.9 % bolus 1,000 mL (0 mL Intravenous Stopped 12/21/21 0842)   HYDROmorphone (DILAUDID) injection 0.5 mg (0.5 mg Intravenous Given 12/21/21 0701)   ketorolac (TORADOL) injection 15 mg (15 mg Intravenous Given 12/21/21 0701)   ondansetron (ZOFRAN) injection 4 mg (4 mg Intravenous Given 12/21/21 0701)   HYDROcodone-acetaminophen (NORCO) 5-325 MG per tablet 1 tablet (1 tablet Oral Given 12/21/21 0841)     ECG/EMG Results (last 24 hours)     ** No results found for the last 24 hours. **        No orders to display                    MDM    Final diagnoses:   Ureterolithiasis   Other hydronephrosis   Hematuria, unspecified type       ED Disposition  ED Disposition     ED Disposition Condition Comment    Discharge Stable           Tico Tinsley MD  [address] [facility] [place], [place] [id number] [phone]    In 1 week           Medication List      New Prescriptions    HYDROcodone-acetaminophen 5-325 MG per tablet  Commonly known as: NORCO  Take 1 tablet by mouth Every 6 (Six) Hours As Needed for Moderate Pain .     ondansetron 4 MG tablet  Commonly known as: ZOFRAN  Take 1 tablet by mouth Every 8 (Eight) Hours As Needed for Nausea.     tamsulosin 0.4 MG capsule 24 hr capsule  Commonly known as: FLOMAX  Take 1 capsule by mouth Daily.           Where to Get Your Medications      These medications were sent to [facility] [address] [place] [address] [address] AT [facility]  [address]  - [phone]  - [phone] FX  [address] [address]    Phone: [phone]   · HYDROcodone-acetaminophen 5-325 MG per tablet  · ondansetron 4 MG tablet  · tamsulosin 0.4 MG capsule 24 hr capsule          Sathya Ivy MD  12/21/21 3958